# Patient Record
Sex: MALE
[De-identification: names, ages, dates, MRNs, and addresses within clinical notes are randomized per-mention and may not be internally consistent; named-entity substitution may affect disease eponyms.]

---
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
## 2019-06-02 ENCOUNTER — HOSPITAL ENCOUNTER (EMERGENCY)
Dept: HOSPITAL 42 - ED | Age: 19
Discharge: HOME | End: 2019-06-02
Payer: SELF-PAY

## 2019-06-02 VITALS — OXYGEN SATURATION: 99 % | HEART RATE: 89 BPM | TEMPERATURE: 98 F

## 2019-06-02 VITALS — DIASTOLIC BLOOD PRESSURE: 80 MMHG | RESPIRATION RATE: 18 BRPM | SYSTOLIC BLOOD PRESSURE: 140 MMHG

## 2019-06-02 DIAGNOSIS — K04.7: Primary | ICD-10-CM

## 2019-06-02 PROCEDURE — 96372 THER/PROPH/DIAG INJ SC/IM: CPT

## 2019-06-02 PROCEDURE — 99282 EMERGENCY DEPT VISIT SF MDM: CPT

## 2019-06-02 NOTE — ED PDOC
Arrival/HPI





- General


Chief Complaint: Dental Pain


Time Seen by Provider: 06/02/19 10:04


Historian: Patient





- History of Present Illness


Narrative History of Present Illness (Text): 


17 y/o male with no significant PMH presents to the ED c/o right lower dental 

pain x 1 day. Associated gum swelling. Pt has had similar issues in the past but

has never been evaluated. He had a crown placed in the area approx 1 year ago, 

which is when the intermittent pain and swelling began. Has not taken any 

medication for pain. Has not followed with dentist or PMD for this complaint. 

Denies fever, chills, overlying skin warmth/redness, sore throat, headache, 

dizziness, nausea, vomiting, or any other associated symptoms. 








Past Medical History





- Provider Review


Nursing Documentation Reviewed: Yes





- Infectious Disease


Hx of Infectious Diseases: None





- Cardiac


Hx Cardiac Disorders: No





- Pulmonary


Hx Respiratory Disorders: No





- Neurological


Hx Neurological Disorder: Yes


Other/Comment: ADHD





- HEENT


Hx HEENT Disorder: No





- Renal


Hx Renal Disorder: No





- Endocrine/Metabolic


Hx Endocrine Disorders: No





- Hematological/Oncological


Hx Blood Disorders: No





- Integumentary


Hx Dermatological Disorder: No





- Musculoskeletal/Rheumatological


Hx Musculoskeletal Disorders: No





- Gastrointestinal


Hx Gastrointestinal Disorders: No





- Genitourinary/Gynecological


Hx Genitourinary Disorders: No





- Psychiatric


Hx Psychophysiologic Disorder: No


Hx Substance Use: Yes





- Anesthesia


Hx Anesthesia: No


Hx Anesthesia Reactions: No





Family/Social History





- Physician Review


Nursing Documentation Reviewed: Yes


Family/Social History: No Known Family HX


Smoking Status: Never Smoked


Hx Alcohol Use: No


Hx Substance Use: Yes


Substance used: Marijuana





Allergies/Home Meds


Allergies/Adverse Reactions: 


Allergies





No Known Allergies Allergy (Verified 01/28/18 09:06)


   











Review of Systems





- Review of Systems


Constitutional: Normal.  absent: Fevers


Eyes: Normal.  absent: Vision Changes


ENT: Other (dental pain)


Respiratory: Normal.  absent: SOB


Cardiovascular: Normal.  absent: Chest Pain


Gastrointestinal: Normal.  absent: Nausea, Vomiting


Musculoskeletal: Normal.  absent: Back Pain, Neck Pain


Skin: Normal.  absent: Rash, Cellulitis


Neurological: Normal.  absent: Headache, Dizziness





Physical Exam


Vital Signs Reviewed: Yes





Vital Signs











  Temp Pulse Resp BP Pulse Ox


 


 06/02/19 10:06  98 F  88  18  140/80  98











Temperature: Afebrile


Blood Pressure: Normal


Pulse: Regular


Respiratory Rate: Normal


Appearance: Positive for: Well-Appearing, Non-Toxic, Comfortable


Pain Distress: None


Mental Status: Positive for: Alert and Oriented X 3





- Systems Exam


Head: Present: Atraumatic, Normocephalic


Pupils: Present: PERRL


Extroacular Muscles: Present: EOMI


Conjunctiva: Present: Normal


Mouth: Present: Moist Mucous Membranes, Normal Lips, Normal Tounge, Other (crown

on right lower posterior molar with abscess to gingiva just lateral to molar, 

draining purulent fluid).  No: Drooling, Trismus


Pharnyx: Present: Normal.  No: ERYTHEMA, EXUDATE, TONSILS ENLARGED


Neck: Present: Normal Range of Motion


Upper Extremity: Present: Normal Inspection, Normal ROM.  No: Cyanosis, Edema


Lower Extremity: Present: Normal ROM


Neurological: Present: GCS=15, Speech Normal, Motor Func Grossly Intact, Normal 

Sensory Function, Gait Normal


Skin: Present: Warm, Dry, Normal Color.  No: Rashes


Psychiatric: Present: Alert, Oriented x 3, Normal Insight, Normal Concentration,

Normal Affect, Normal Mood





Medical Decision Making


ED Course and Treatment: 





06/02/19 10:29


Initial Plan:


* Toradol


* Augmentin





On repeat exam, abscess is draining purulent fluid, no indication for I&D


Pt seen and examined at bedside by Dr. Núñez who recommends antibiotics and 

discharge home with dental followup.


Advised PMD and dental followup.





Diagnostic testing results and plan of care discussed with patient. Strict 

instructions given regarding prescription use, importance of followup, and 

signs/symptoms to return to ER including worsening pain, skin redness/warmth, 

dizziness, fever, vomiting, or any other new/worsening symptoms. Pt verbalized 

understanding of discussion. Patient is A&Ox3, ambulating with steady gait, with

vital signs stable for discharge.











- Medication Orders


Current Medication Orders: 














Discontinued Medications





Ketorolac Tromethamine (Toradol)  60 mg IM STAT STA


   Stop: 06/02/19 10:10


   Last Admin: 06/02/19 10:24  Dose: 60 mg





MAR Pain Assessment


 Document     06/02/19 10:24  [email or web address]  (Rec: 06/02/19 10:25  CASTS1  

[id number])


     Pain Reassessment


      Is this a pain reassessment?               No


     Sleep


      Is patient sleeping during reassessment?   No


     Presence of Pain


      Presence of Pain                           Yes


     Pain Scale Used


     Protocol:  PSCALES


      Pain Scale Used                            Numeric


     Location


      Left, Right or Bilateral                   Right


      Upper or Lower                             Lower


      Pain Location Body Site                    Face


     Description


      Intensity of Pain at present               5


      Pain Behavior                              Facial Grimacing


      Aggravating Factors                        Changing Position


      Alleviating Factors/Management             Medication


       Techniques                                


      Alleviating Factors                        Medication


IM Administration Charges


 Document     06/02/19 10:24  CASTS1  (Rec: 06/02/19 10:25  CASTS1  

[id number])


     Injection Site


      MAR Injection Site                         Right Gluteus Fabian


     Charges for Administration


      # of IM Administrations                    1














Disposition/Present on Arrival





- Present on Arrival


Any Indicators Present on Arrival: No


History of DVT/PE: No


History of Uncontrolled Diabetes: No


Urinary Catheter: No


History of Decub. Ulcer: No


History Surgical Site Infection Following: None





- Disposition


Have Diagnosis and Disposition been Completed?: Yes


Diagnosis: 


 Dental abscess





Disposition: HOME/ ROUTINE


Disposition Time: 10:20


Patient Plan: Discharge


Condition: IMPROVED


Discharge Instructions (ExitCare):  Tooth Abscess (DC), Dental Pain (DC)


Additional Instructions: 


Augmentin every 12 hours for 7 days


Ibuprofen every 8 hours as needed for pain, take with food


Increase fluids


Followup with dentist within 2 days


Followup with primary doctor within 2 days


Return to ER with any new/worsening symptoms


Prescriptions: 


Amoxicillin/Clavulanate [Augmentin 875 MG-125 MG] 1 tab PO Q12H #14 tab


Ibuprofen [Motrin Tab] 800 mg PO Q8 PRN #30 tab


 PRN Reason: Pain, Moderate (4-7)


Referrals: 


Joanne Sylvester MD [Medical Doctor] - Follow up with primary


[facility] - Follow up with primary


Forms:  [facility] (English), WORK NOTE